# Patient Record
Sex: FEMALE | Race: WHITE | NOT HISPANIC OR LATINO | Employment: OTHER | URBAN - METROPOLITAN AREA
[De-identification: names, ages, dates, MRNs, and addresses within clinical notes are randomized per-mention and may not be internally consistent; named-entity substitution may affect disease eponyms.]

---

## 2022-10-11 ENCOUNTER — NURSE TRIAGE (OUTPATIENT)
Dept: ADMINISTRATIVE | Facility: CLINIC | Age: 72
End: 2022-10-11
Payer: MEDICARE

## 2022-10-11 NOTE — TELEPHONE ENCOUNTER
See the triage notes for more details, I advised it was ok to wait for her appt at 1:30 today.  I advised she rest and relax until her appt time, find something to distract/soothe her in the interim, and keep her appt for 1:30.  I also advised if any sob, cp, blurry vision, balance problems/dizziness to call back to [facility] right away; she verbalized understanding.      Reason for Disposition   Patient wants to be seen     Mrs. Iglesias is already scheduled for 1:30 today with her provider, she is having elevated bp 170/96, , no cardiac or neurological symptoms.  She is asking if she should go to the ED or wait for her 1:30 pm appt today?  Of note, she did not take her morning medications until 1100 this am, just about 1 hour ago.    Additional Information   Negative: Sounds like a life-threatening emergency to the triager   Negative: Symptom is main concern (e.g., headache, chest pain)   Negative: Low blood pressure is main concern   Negative: Systolic BP >= 160 OR Diastolic >= 100, and any cardiac or neurologic symptoms (e.g., chest pain, difficulty breathing, unsteady gait, blurred vision)   Negative: Pregnant 20 or more weeks (or postpartum < 6 weeks) with new hand or face swelling   Negative: Pregnant 20 or more weeks (or postpartum < 6 weeks) and Systolic BP >= 160 OR Diastolic >= 100   Negative: Patient sounds very sick or weak to the triager   Negative: Systolic BP >= 200 OR Diastolic >= 120 and having NO cardiac or neurologic symptoms   Negative: Pregnant 20 or more weeks (or postpartum < 6 weeks) with Systolic BP >= 140 OR Diastolic >= 90   Negative: Systolic BP >= 180 OR Diastolic >= 110, and missed most recent dose of blood pressure medication   Negative: Systolic BP >= 180 OR Diastolic >= 110    Protocols used: Blood Pressure - High-A-OH

## 2022-11-14 ENCOUNTER — OFFICE VISIT (OUTPATIENT)
Dept: CARDIOLOGY | Facility: CLINIC | Age: 72
End: 2022-11-14
Payer: MEDICARE

## 2022-11-14 VITALS
HEART RATE: 79 BPM | HEIGHT: 68 IN | SYSTOLIC BLOOD PRESSURE: 162 MMHG | DIASTOLIC BLOOD PRESSURE: 86 MMHG | BODY MASS INDEX: 40.32 KG/M2 | WEIGHT: 266 LBS | OXYGEN SATURATION: 97 %

## 2022-11-14 DIAGNOSIS — R07.9 CHEST PAIN, UNSPECIFIED TYPE: ICD-10-CM

## 2022-11-14 DIAGNOSIS — F41.9 ANXIETY: ICD-10-CM

## 2022-11-14 DIAGNOSIS — R07.89 ATYPICAL CHEST PAIN: ICD-10-CM

## 2022-11-14 DIAGNOSIS — I10 PRIMARY HYPERTENSION: ICD-10-CM

## 2022-11-14 DIAGNOSIS — Z91.89 AT RISK FOR SLEEP APNEA: ICD-10-CM

## 2022-11-14 DIAGNOSIS — E66.01 CLASS 3 SEVERE OBESITY DUE TO EXCESS CALORIES WITH SERIOUS COMORBIDITY AND BODY MASS INDEX (BMI) OF 40.0 TO 44.9 IN ADULT: ICD-10-CM

## 2022-11-14 PROBLEM — E66.813 CLASS 3 SEVERE OBESITY DUE TO EXCESS CALORIES WITH SERIOUS COMORBIDITY AND BODY MASS INDEX (BMI) OF 40.0 TO 44.9 IN ADULT: Status: ACTIVE | Noted: 2022-11-14

## 2022-11-14 PROCEDURE — 1126F PR PAIN SEVERITY QUANTIFIED, NO PAIN PRESENT: ICD-10-PCS | Mod: CPTII,S$GLB,, | Performed by: INTERNAL MEDICINE

## 2022-11-14 PROCEDURE — 1159F MED LIST DOCD IN RCRD: CPT | Mod: CPTII,S$GLB,, | Performed by: INTERNAL MEDICINE

## 2022-11-14 PROCEDURE — 3079F DIAST BP 80-89 MM HG: CPT | Mod: CPTII,S$GLB,, | Performed by: INTERNAL MEDICINE

## 2022-11-14 PROCEDURE — 1101F PR PT FALLS ASSESS DOC 0-1 FALLS W/OUT INJ PAST YR: ICD-10-PCS | Mod: CPTII,S$GLB,, | Performed by: INTERNAL MEDICINE

## 2022-11-14 PROCEDURE — 3079F PR MOST RECENT DIASTOLIC BLOOD PRESSURE 80-89 MM HG: ICD-10-PCS | Mod: CPTII,S$GLB,, | Performed by: INTERNAL MEDICINE

## 2022-11-14 PROCEDURE — 3008F PR BODY MASS INDEX (BMI) DOCUMENTED: ICD-10-PCS | Mod: CPTII,S$GLB,, | Performed by: INTERNAL MEDICINE

## 2022-11-14 PROCEDURE — 3288F PR FALLS RISK ASSESSMENT DOCUMENTED: ICD-10-PCS | Mod: CPTII,S$GLB,, | Performed by: INTERNAL MEDICINE

## 2022-11-14 PROCEDURE — 3008F BODY MASS INDEX DOCD: CPT | Mod: CPTII,S$GLB,, | Performed by: INTERNAL MEDICINE

## 2022-11-14 PROCEDURE — 99999 PR PBB SHADOW E&M-EST. PATIENT-LVL IV: ICD-10-PCS | Mod: PBBFAC,,, | Performed by: INTERNAL MEDICINE

## 2022-11-14 PROCEDURE — 1160F PR REVIEW ALL MEDS BY PRESCRIBER/CLIN PHARMACIST DOCUMENTED: ICD-10-PCS | Mod: CPTII,S$GLB,, | Performed by: INTERNAL MEDICINE

## 2022-11-14 PROCEDURE — 99204 OFFICE O/P NEW MOD 45 MIN: CPT | Mod: S$GLB,,, | Performed by: INTERNAL MEDICINE

## 2022-11-14 PROCEDURE — 1159F PR MEDICATION LIST DOCUMENTED IN MEDICAL RECORD: ICD-10-PCS | Mod: CPTII,S$GLB,, | Performed by: INTERNAL MEDICINE

## 2022-11-14 PROCEDURE — 1160F RVW MEDS BY RX/DR IN RCRD: CPT | Mod: CPTII,S$GLB,, | Performed by: INTERNAL MEDICINE

## 2022-11-14 PROCEDURE — 99204 PR OFFICE/OUTPT VISIT, NEW, LEVL IV, 45-59 MIN: ICD-10-PCS | Mod: S$GLB,,, | Performed by: INTERNAL MEDICINE

## 2022-11-14 PROCEDURE — 3077F SYST BP >= 140 MM HG: CPT | Mod: CPTII,S$GLB,, | Performed by: INTERNAL MEDICINE

## 2022-11-14 PROCEDURE — 1126F AMNT PAIN NOTED NONE PRSNT: CPT | Mod: CPTII,S$GLB,, | Performed by: INTERNAL MEDICINE

## 2022-11-14 PROCEDURE — 3077F PR MOST RECENT SYSTOLIC BLOOD PRESSURE >= 140 MM HG: ICD-10-PCS | Mod: CPTII,S$GLB,, | Performed by: INTERNAL MEDICINE

## 2022-11-14 PROCEDURE — 99999 PR PBB SHADOW E&M-EST. PATIENT-LVL IV: CPT | Mod: PBBFAC,,, | Performed by: INTERNAL MEDICINE

## 2022-11-14 PROCEDURE — 1101F PT FALLS ASSESS-DOCD LE1/YR: CPT | Mod: CPTII,S$GLB,, | Performed by: INTERNAL MEDICINE

## 2022-11-14 PROCEDURE — 3288F FALL RISK ASSESSMENT DOCD: CPT | Mod: CPTII,S$GLB,, | Performed by: INTERNAL MEDICINE

## 2022-11-14 RX ORDER — CLONIDINE 0.1 MG/24H
1 PATCH, EXTENDED RELEASE TRANSDERMAL
COMMUNITY
End: 2022-11-14 | Stop reason: ALTCHOICE

## 2022-11-14 RX ORDER — CLONIDINE HYDROCHLORIDE 0.1 MG/1
0.1 TABLET ORAL 2 TIMES DAILY
Qty: 60 TABLET | Refills: 11
Start: 2022-11-14 | End: 2023-02-13 | Stop reason: SDUPTHER

## 2022-11-14 RX ORDER — SPIRONOLACTONE 25 MG/1
25 TABLET ORAL 2 TIMES DAILY
Qty: 60 TABLET | Refills: 11 | Status: SHIPPED | OUTPATIENT
Start: 2022-11-14 | End: 2023-02-13 | Stop reason: SDUPTHER

## 2022-11-14 NOTE — ASSESSMENT & PLAN NOTE
Encouraged lifestyle modifications (diet, exercise, and weight loss).    - pt declines bariatric medicine

## 2022-11-14 NOTE — PROGRESS NOTES
Subjective:    Patient ID:  Josie Iglesias is a 72 y.o. female who presents for evaluation of Hypertension and Chest Pain      PCP: Nunu Chin MD     HPI  Pt is a 71 yo F w/ PMH of HLD, HTN, Anxiety d/o, and MO w/ BMI of 40.5 who presents for evaluation of cp and HTN.  She mentions that she has had HTN x30 yrs and over the past month her BP has started to increase and she has had more episodes of anxiety.  She mentions that clonidine was added to her regimen.  She states that her BP runs 140/80s but has been running 160s/90s in the evenings.  She notes chest heaviness which may come and go however denies exertional cp.  She denies sob, edema, orthopnea, PND, presyncope, LOC, palpitations, and claudication.  She notes compliance w/ meds and denies side effects.  She does not exercise and is sedentary.         Past Medical History:   Diagnosis Date    Anxiety disorder, unspecified     Depression     High cholesterol     Hypertension      Past Surgical History:   Procedure Laterality Date    APPENDECTOMY      CHOLECYSTECTOMY      HYSTERECTOMY       Social History     Socioeconomic History    Marital status:    Tobacco Use    Smoking status: Never    Smokeless tobacco: Never     History reviewed. No pertinent family history.    Review of patient's allergies indicates:  No Known Allergies    Medication List with Changes/Refills   New Medications    CLONIDINE (CATAPRES) 0.1 MG TABLET    Take 1 tablet (0.1 mg total) by mouth 2 (two) times daily.    SPIRONOLACTONE (ALDACTONE) 25 MG TABLET    Take 1 tablet (25 mg total) by mouth 2 (two) times daily.   Current Medications    AMLODIPINE (NORVASC) 10 MG TABLET    Take 10 mg by mouth once daily.    HYDROCHLOROTHIAZIDE (HYDRODIURIL) 25 MG TABLET    Take 25 mg by mouth once daily.    ROSUVASTATIN (CRESTOR) 10 MG TABLET    Take 10 mg by mouth once daily.    SERTRALINE (ZOLOFT) 100 MG TABLET    Take 100 mg by mouth once daily.   Discontinued Medications    CLONIDINE  "0.1 MG/24 HR TD PTWK (CATAPRES) 0.1 MG/24 HR    Place 1 patch onto the skin every 7 days.    HYDRALAZINE (APRESOLINE) 25 MG TABLET    Take 1 tablet (25 mg total) by mouth 3 (three) times daily.       Review of Systems   Constitutional: Negative for diaphoresis and fever.   HENT:  Negative for congestion and hearing loss.    Eyes:  Negative for blurred vision and pain.   Cardiovascular:  Positive for chest pain. Negative for claudication, dyspnea on exertion, leg swelling, near-syncope, palpitations and syncope.   Respiratory:  Negative for shortness of breath and sleep disturbances due to breathing.    Hematologic/Lymphatic: Negative for bleeding problem. Does not bruise/bleed easily.   Skin:  Negative for color change and poor wound healing.   Gastrointestinal:  Negative for abdominal pain and nausea.   Genitourinary:  Negative for bladder incontinence and flank pain.   Neurological:  Negative for focal weakness and light-headedness.      Objective:   BP (!) 162/86   Pulse 79   Ht 5' 8" (1.727 m)   Wt 120.7 kg (266 lb)   SpO2 97%   BMI 40.45 kg/m²    Physical Exam  Constitutional:       Appearance: She is well-developed. She is obese. She is not diaphoretic.   HENT:      Head: Normocephalic and atraumatic.   Eyes:      General: No scleral icterus.     Pupils: Pupils are equal, round, and reactive to light.   Neck:      Vascular: No JVD.   Cardiovascular:      Rate and Rhythm: Normal rate and regular rhythm.      Pulses: Intact distal pulses.      Heart sounds: S1 normal and S2 normal. No murmur heard.    No friction rub. No gallop.   Pulmonary:      Effort: Pulmonary effort is normal. No respiratory distress.      Breath sounds: Normal breath sounds. No wheezing or rales.   Chest:      Chest wall: No tenderness.   Abdominal:      General: Bowel sounds are normal. There is no distension.      Palpations: Abdomen is soft. There is no mass.      Tenderness: There is no abdominal tenderness. There is no rebound. "   Musculoskeletal:         General: No tenderness. Normal range of motion.      Cervical back: Normal range of motion and neck supple.   Skin:     General: Skin is warm and dry.      Coloration: Skin is not pale.   Neurological:      Mental Status: She is alert and oriented to person, place, and time.      Coordination: Coordination normal.      Deep Tendon Reflexes: Reflexes normal.   Psychiatric:         Behavior: Behavior normal.         Judgment: Judgment normal.         ECG: 10/18/2022- reviewed.  NSR, Cannot r/o anterior infarct.      Assessment:       1. Chest pain, unspecified type    2. Class 3 severe obesity due to excess calories with serious comorbidity and body mass index (BMI) of 40.0 to 44.9 in adult    3. Primary hypertension    4. At risk for sleep apnea    5. Anxiety    6. Atypical chest pain         Plan:         Class 3 severe obesity due to excess calories with serious comorbidity and body mass index (BMI) of 40.0 to 44.9 in adult  Encouraged lifestyle modifications (diet, exercise, and weight loss).    - pt declines bariatric medicine     Primary hypertension  Uncontrolled.  Goal BP < 140/90.  Compliant w/ meds.   - add spironolactone 25 mg BID w/ goal to wean and d/c clonidine   - continue other meds  - risk factor and lifestyle modifications     At risk for sleep apnea  Refer to sleep medicine.      Anxiety  Refer to psych.     Atypical chest pain  Check ETT to r/o ishcemia.        Total duration of face to face visit time 30 minutes.  Total time spent counseling greater than fifty percent of total visit time.  Counseling included discussion regarding imaging findings, diagnosis, possibilities, treatment options, risks and benefits.  The patient had many questions regarding the options and long-term effects      Augusto Martinez M.D.  Interventional Cardiology

## 2022-11-14 NOTE — ASSESSMENT & PLAN NOTE
Uncontrolled.  Goal BP < 140/90.  Compliant w/ meds.   - add spironolactone 25 mg BID w/ goal to wean and d/c clonidine   - continue other meds  - risk factor and lifestyle modifications

## 2022-11-28 ENCOUNTER — TELEPHONE (OUTPATIENT)
Dept: CARDIOLOGY | Facility: CLINIC | Age: 72
End: 2022-11-28
Payer: MEDICARE

## 2022-11-28 ENCOUNTER — CLINICAL SUPPORT (OUTPATIENT)
Dept: FAMILY MEDICINE | Facility: CLINIC | Age: 72
End: 2022-11-28
Payer: MEDICARE

## 2022-11-28 ENCOUNTER — TELEPHONE (OUTPATIENT)
Dept: FAMILY MEDICINE | Facility: CLINIC | Age: 72
End: 2022-11-28

## 2022-11-28 VITALS — OXYGEN SATURATION: 95 % | DIASTOLIC BLOOD PRESSURE: 85 MMHG | HEART RATE: 75 BPM | SYSTOLIC BLOOD PRESSURE: 145 MMHG

## 2022-11-28 PROCEDURE — 99999 PR PBB SHADOW E&M-EST. PATIENT-LVL II: ICD-10-PCS | Mod: PBBFAC,,,

## 2022-11-28 PROCEDURE — 99999 PR PBB SHADOW E&M-EST. PATIENT-LVL II: CPT | Mod: PBBFAC,,,

## 2022-11-28 NOTE — TELEPHONE ENCOUNTER
----- Message from Augusto Martinez III, MD sent at 11/28/2022  8:47 AM CST -----  Please contact the patient and let them know that their results were fine and do not require any change in treatment.

## 2022-11-28 NOTE — TELEPHONE ENCOUNTER
""Please inform pt to start taking 50 mg of spironolactone (2 tablets) and continue to monitor her BP at home.  Contact the clinic in 2 weeks with her BP changes at that time."       Called pt and lvm with call back number   "

## 2022-11-28 NOTE — TELEPHONE ENCOUNTER
BP check done as ordered.  Patient's bp remains high.  Home log put into chart.  150s/90s x 2 iraj cuff.  Her home cuff only fits on her wrist.  140s/80s.  Patient very anxious about her pressure being elevated.  States she just doesn't feel good.  Instructed to continue taking her meds as ordered and try calming exercises, continue to watch her diet.  Verbalized understanding.  Forwarding to Dr. Martinez for evaluation. She will be called with any med changes.

## 2022-11-29 ENCOUNTER — TELEPHONE (OUTPATIENT)
Dept: CARDIOLOGY | Facility: CLINIC | Age: 72
End: 2022-11-29
Payer: MEDICARE

## 2022-11-29 NOTE — TELEPHONE ENCOUNTER
----- Message from Xavier Silverio sent at 11/28/2022  5:20 PM CST -----  .Type:  Patient Returning Call    Who Called:Pt  Who Left Message for Patient:Emy Snyder  Would the patient rather a call back or a response via [facility]? Call  Best Call Back Number:[phone]

## 2022-11-29 NOTE — TELEPHONE ENCOUNTER
Called pt x2, already lvm yesterday    Called pt's spouse, lvm stating we are trying to contact Josie Iglesias left call back number

## 2022-11-29 NOTE — TELEPHONE ENCOUNTER
Received pt call, spoke with pt regarding medication instructions, informed pt to start taking 50 mg of spironolactone (2 tablets) and continue to monitor her BP at home. Informed pt to contact the clinic in 2 weeks with her BP changes. Per Dr. Martinez   Pt stated, seeming frustrated that this was dosage she is already taking, I informed pt that we will continue to monitor her blood pressure as the medication takes full effect, pt expressed understanding     Pt then asks about her stress test results claiming she has not heard anything about them, I informed pt that we tried to contact them regarding the results and left a voice mail. I then informed pt of test results. Pt expressed understanding.

## 2022-12-12 ENCOUNTER — TELEPHONE (OUTPATIENT)
Dept: CARDIOLOGY | Facility: CLINIC | Age: 72
End: 2022-12-12
Payer: MEDICARE

## 2022-12-12 NOTE — TELEPHONE ENCOUNTER
Patient called to give blood pressure readings from the last two weeks     Pt did not have mychart and was not able to send a photo, readings were given verbally    Date, morning time, afternoon time    11/29- 147/88   141/84  11/30- 140/98 144/87  12/1- 138/93  130/92  12/2- 133/93   135/85  12/3- 143/97  139/87   12/4-  139/91 123/89   12/5- 140/97  132/86  12/6- 135/94  126/96  12/7- 131/85 123/92  12/8- 133/97  126/81  12/9- 131/100 137/85    12/10- 124/89  128/80  12/11- 130/94 128/91  12/12- 129/92 119/73

## 2022-12-12 NOTE — TELEPHONE ENCOUNTER
----- Message from Rowan Campos sent at 12/12/2022  1:18 PM CST -----  Needs advice from nurse:      Who Called:pt  Regarding:returning a call to [facility]  Would the patient rather a call back or VIA [facility]?  Best Call Back number:[phone]  Additional Info:

## 2022-12-28 ENCOUNTER — TELEPHONE (OUTPATIENT)
Dept: CARDIOLOGY | Facility: CLINIC | Age: 72
End: 2022-12-28
Payer: MEDICARE

## 2022-12-28 NOTE — TELEPHONE ENCOUNTER
----- Message from Rowan Campos sent at 12/28/2022  3:32 PM CST -----  Needs advice from nurse:      Who Called:pt  Regarding:returning a call to [facility]  Would the patient rather a call back or [facility]IA [facility]?  Best Call Back number:[phone]  Additional Info:

## 2023-02-13 ENCOUNTER — OFFICE VISIT (OUTPATIENT)
Dept: CARDIOLOGY | Facility: CLINIC | Age: 73
End: 2023-02-13
Payer: MEDICARE

## 2023-02-13 VITALS
BODY MASS INDEX: 39.25 KG/M2 | HEIGHT: 68 IN | HEART RATE: 78 BPM | SYSTOLIC BLOOD PRESSURE: 158 MMHG | OXYGEN SATURATION: 97 % | WEIGHT: 259 LBS | DIASTOLIC BLOOD PRESSURE: 88 MMHG

## 2023-02-13 DIAGNOSIS — F41.9 ANXIETY: ICD-10-CM

## 2023-02-13 DIAGNOSIS — R07.89 ATYPICAL CHEST PAIN: ICD-10-CM

## 2023-02-13 DIAGNOSIS — E66.01 CLASS 2 SEVERE OBESITY DUE TO EXCESS CALORIES WITH SERIOUS COMORBIDITY AND BODY MASS INDEX (BMI) OF 39.0 TO 39.9 IN ADULT: ICD-10-CM

## 2023-02-13 DIAGNOSIS — Z91.89 AT RISK FOR SLEEP APNEA: ICD-10-CM

## 2023-02-13 DIAGNOSIS — I10 PRIMARY HYPERTENSION: ICD-10-CM

## 2023-02-13 PROBLEM — E66.812 CLASS 2 SEVERE OBESITY DUE TO EXCESS CALORIES WITH SERIOUS COMORBIDITY AND BODY MASS INDEX (BMI) OF 39.0 TO 39.9 IN ADULT: Status: ACTIVE | Noted: 2022-11-14

## 2023-02-13 PROCEDURE — 3288F FALL RISK ASSESSMENT DOCD: CPT | Mod: CPTII,S$GLB,, | Performed by: INTERNAL MEDICINE

## 2023-02-13 PROCEDURE — 1159F PR MEDICATION LIST DOCUMENTED IN MEDICAL RECORD: ICD-10-PCS | Mod: CPTII,S$GLB,, | Performed by: INTERNAL MEDICINE

## 2023-02-13 PROCEDURE — 3008F BODY MASS INDEX DOCD: CPT | Mod: CPTII,S$GLB,, | Performed by: INTERNAL MEDICINE

## 2023-02-13 PROCEDURE — 1126F AMNT PAIN NOTED NONE PRSNT: CPT | Mod: CPTII,S$GLB,, | Performed by: INTERNAL MEDICINE

## 2023-02-13 PROCEDURE — 1160F RVW MEDS BY RX/DR IN RCRD: CPT | Mod: CPTII,S$GLB,, | Performed by: INTERNAL MEDICINE

## 2023-02-13 PROCEDURE — 3077F PR MOST RECENT SYSTOLIC BLOOD PRESSURE >= 140 MM HG: ICD-10-PCS | Mod: CPTII,S$GLB,, | Performed by: INTERNAL MEDICINE

## 2023-02-13 PROCEDURE — 3288F PR FALLS RISK ASSESSMENT DOCUMENTED: ICD-10-PCS | Mod: CPTII,S$GLB,, | Performed by: INTERNAL MEDICINE

## 2023-02-13 PROCEDURE — 99999 PR PBB SHADOW E&M-EST. PATIENT-LVL III: ICD-10-PCS | Mod: PBBFAC,,, | Performed by: INTERNAL MEDICINE

## 2023-02-13 PROCEDURE — 1159F MED LIST DOCD IN RCRD: CPT | Mod: CPTII,S$GLB,, | Performed by: INTERNAL MEDICINE

## 2023-02-13 PROCEDURE — 1101F PT FALLS ASSESS-DOCD LE1/YR: CPT | Mod: CPTII,S$GLB,, | Performed by: INTERNAL MEDICINE

## 2023-02-13 PROCEDURE — 99214 OFFICE O/P EST MOD 30 MIN: CPT | Mod: S$GLB,,, | Performed by: INTERNAL MEDICINE

## 2023-02-13 PROCEDURE — 1101F PR PT FALLS ASSESS DOC 0-1 FALLS W/OUT INJ PAST YR: ICD-10-PCS | Mod: CPTII,S$GLB,, | Performed by: INTERNAL MEDICINE

## 2023-02-13 PROCEDURE — 1126F PR PAIN SEVERITY QUANTIFIED, NO PAIN PRESENT: ICD-10-PCS | Mod: CPTII,S$GLB,, | Performed by: INTERNAL MEDICINE

## 2023-02-13 PROCEDURE — 99214 PR OFFICE/OUTPT VISIT, EST, LEVL IV, 30-39 MIN: ICD-10-PCS | Mod: S$GLB,,, | Performed by: INTERNAL MEDICINE

## 2023-02-13 PROCEDURE — 1160F PR REVIEW ALL MEDS BY PRESCRIBER/CLIN PHARMACIST DOCUMENTED: ICD-10-PCS | Mod: CPTII,S$GLB,, | Performed by: INTERNAL MEDICINE

## 2023-02-13 PROCEDURE — 99999 PR PBB SHADOW E&M-EST. PATIENT-LVL III: CPT | Mod: PBBFAC,,, | Performed by: INTERNAL MEDICINE

## 2023-02-13 PROCEDURE — 3079F PR MOST RECENT DIASTOLIC BLOOD PRESSURE 80-89 MM HG: ICD-10-PCS | Mod: CPTII,S$GLB,, | Performed by: INTERNAL MEDICINE

## 2023-02-13 PROCEDURE — 3079F DIAST BP 80-89 MM HG: CPT | Mod: CPTII,S$GLB,, | Performed by: INTERNAL MEDICINE

## 2023-02-13 PROCEDURE — 3077F SYST BP >= 140 MM HG: CPT | Mod: CPTII,S$GLB,, | Performed by: INTERNAL MEDICINE

## 2023-02-13 PROCEDURE — 3008F PR BODY MASS INDEX (BMI) DOCUMENTED: ICD-10-PCS | Mod: CPTII,S$GLB,, | Performed by: INTERNAL MEDICINE

## 2023-02-13 RX ORDER — SPIRONOLACTONE 50 MG/1
50 TABLET, FILM COATED ORAL 2 TIMES DAILY
Qty: 60 TABLET | Refills: 11 | Status: SHIPPED | OUTPATIENT
Start: 2023-02-13 | End: 2023-03-30 | Stop reason: SDUPTHER

## 2023-02-13 RX ORDER — CLONIDINE HYDROCHLORIDE 0.1 MG/1
0.1 TABLET ORAL DAILY
Qty: 60 TABLET | Refills: 11
Start: 2023-02-13 | End: 2024-02-13

## 2023-02-13 NOTE — ASSESSMENT & PLAN NOTE
Improved.  Goal BP < 140/90.  Compliant w/ meds.   - increase spironolactone to 50 mg BID and wean and d/c clonidine to daily x2 wks then d/c  - continue other meds  - pt to continue to monitor BP   - risk factor and lifestyle modifications

## 2023-02-13 NOTE — PROGRESS NOTES
Subjective:    Patient ID:  Josie Iglesias is a 72 y.o. female who presents for follow-up of Follow-up        PCP: Nunu Chin MD     HPI  Pt is a 73 yo F w/ PMH of HLD, HTN, Anxiety d/o, and MO w/ BMI of 39.4 who presents for f/u of cp and HTN.  She was last seen 11/14/2022 and mentioned that she had HTN x30 yrs and over the past few months her BP had started to increase and had more episodes of anxiety.  Spironolactone was added w/ the goal to d/c clonidine and had an ETT which noted poor functional tolerance. Since this time she has been doing well and states her BP has improved.  She states that her BP now runs 110-130s/80-90s.  She denies further episodes of cp.  She denies sob, edema, orthopnea, PND, presyncope, LOC, palpitations, and claudication.  She notes compliance w/ meds and denies side effects.  She does not exercise and is sedentary.         Past Medical History:   Diagnosis Date    Anxiety disorder, unspecified     Depression     High cholesterol     Hypertension      Past Surgical History:   Procedure Laterality Date    APPENDECTOMY      CHOLECYSTECTOMY      HYSTERECTOMY       Social History     Socioeconomic History    Marital status:    Tobacco Use    Smoking status: Never    Smokeless tobacco: Never     History reviewed. No pertinent family history.    Review of patient's allergies indicates:  No Known Allergies    Medication List with Changes/Refills   Current Medications    AMLODIPINE (NORVASC) 10 MG TABLET    Take 10 mg by mouth once daily.    HYDROCHLOROTHIAZIDE (HYDRODIURIL) 25 MG TABLET    Take 25 mg by mouth once daily.    ROSUVASTATIN (CRESTOR) 10 MG TABLET    Take 10 mg by mouth once daily.    SERTRALINE (ZOLOFT) 100 MG TABLET    Take 100 mg by mouth once daily.   Changed and/or Refilled Medications    Modified Medication Previous Medication    CLONIDINE (CATAPRES) 0.1 MG TABLET cloNIDine (CATAPRES) 0.1 MG tablet       Take 1 tablet (0.1 mg total) by mouth once daily.     "Take 1 tablet (0.1 mg total) by mouth 2 (two) times daily.    SPIRONOLACTONE (ALDACTONE) 50 MG TABLET spironolactone (ALDACTONE) 25 MG tablet       Take 1 tablet (50 mg total) by mouth 2 (two) times daily.    Take 1 tablet (25 mg total) by mouth 2 (two) times daily.       Review of Systems   Constitutional: Negative for diaphoresis and fever.   HENT:  Negative for congestion and hearing loss.    Eyes:  Negative for blurred vision and pain.   Cardiovascular:  Positive for chest pain. Negative for claudication, dyspnea on exertion, leg swelling, near-syncope, palpitations and syncope.   Respiratory:  Negative for shortness of breath and sleep disturbances due to breathing.    Hematologic/Lymphatic: Negative for bleeding problem. Does not bruise/bleed easily.   Skin:  Negative for color change and poor wound healing.   Gastrointestinal:  Negative for abdominal pain and nausea.   Genitourinary:  Negative for bladder incontinence and flank pain.   Neurological:  Negative for focal weakness and light-headedness.      Objective:   BP (!) 158/88   Pulse 78   Ht 5' 8" (1.727 m)   Wt 117.5 kg (259 lb)   SpO2 97%   BMI 39.38 kg/m²    Physical Exam  Constitutional:       Appearance: She is well-developed. She is obese. She is not diaphoretic.   HENT:      Head: Normocephalic and atraumatic.   Eyes:      General: No scleral icterus.     Pupils: Pupils are equal, round, and reactive to light.   Neck:      Vascular: No JVD.   Cardiovascular:      Rate and Rhythm: Normal rate and regular rhythm.      Pulses: Intact distal pulses.      Heart sounds: S1 normal and S2 normal. No murmur heard.    No friction rub. No gallop.   Pulmonary:      Effort: Pulmonary effort is normal. No respiratory distress.      Breath sounds: Normal breath sounds. No wheezing or rales.   Chest:      Chest wall: No tenderness.   Abdominal:      General: Bowel sounds are normal. There is no distension.      Palpations: Abdomen is soft. There is no mass. "      Tenderness: There is no abdominal tenderness. There is no rebound.   Musculoskeletal:         General: No tenderness. Normal range of motion.      Cervical back: Normal range of motion and neck supple.   Skin:     General: Skin is warm and dry.      Coloration: Skin is not pale.   Neurological:      Mental Status: She is alert and oriented to person, place, and time.      Coordination: Coordination normal.      Deep Tendon Reflexes: Reflexes normal.   Psychiatric:         Behavior: Behavior normal.         Judgment: Judgment normal.         ECG: 10/18/2022- reviewed.  NSR, Cannot r/o anterior infarct.      ETT: 11/23/2022- reviewed.    Conclusion         The patient exercised for 1 minutes 9 seconds on a Fausto protocol, corresponding to a functional capacity of 3 METS, achieving a peak heart rate of 150 bpm, which is 105 % of the age predicted maximum heart rate.    The EKG portion of this study is negative for ischemia.    The patient reported no chest pain during the stress test.    The blood pressure response to stress was normal.    During stress, rare PVCs are noted.    Assessment:       1. Primary hypertension    2. Class 2 severe obesity due to excess calories with serious comorbidity and body mass index (BMI) of 39.0 to 39.9 in adult    3. Anxiety    4. Atypical chest pain    5. At risk for sleep apnea         Plan:         Anxiety  Pt declines to see psych.       Primary hypertension  Improved.  Goal BP < 140/90.  Compliant w/ meds.   - increase spironolactone to 50 mg BID and wean and d/c clonidine to daily x2 wks then d/c  - continue other meds  - pt to continue to monitor BP   - risk factor and lifestyle modifications     Class 2 severe obesity due to excess calories with serious comorbidity and body mass index (BMI) of 39.0 to 39.9 in adult  Encouraged lifestyle modifications (diet, exercise, and weight loss).    - pt declines bariatric medicine     Atypical chest pain  Resolved.  Negative ETT for  ischemia however noted to have poor exercise tolerance.      At risk for sleep apnea  Declines sleep medicine.        Total duration of face to face visit time 30 minutes.  Total time spent counseling greater than fifty percent of total visit time.  Counseling included discussion regarding imaging findings, diagnosis, possibilities, treatment options, risks and benefits.  The patient had many questions regarding the options and long-term effects      Augusto Martinez M.D.  Interventional Cardiology

## 2023-02-28 ENCOUNTER — TELEPHONE (OUTPATIENT)
Dept: CARDIOLOGY | Facility: CLINIC | Age: 73
End: 2023-02-28
Payer: MEDICARE

## 2023-02-28 NOTE — TELEPHONE ENCOUNTER
----- Message from Xavier Silverio sent at 2/28/2023  9:32 AM CST -----  .Type:  Patient Returning Call    Who Called:Pt  Who Left Message for Patient:Petra  Would the patient rather a call back or a response via [facility]? Call  Best Call Back Number:[phone]  Additional Information:   Blood Test Results

## 2023-02-28 NOTE — TELEPHONE ENCOUNTER
"Informed patient     " Her BP has improved as it is within the normal range 120-130s.  I will not make any other changes to her regimen since her BP is in a good range.  She may follow up with her PCP for her fatigue.  Thanks." - Dr. Martinez     Patient expressed understanding and had no further questions   "

## 2023-02-28 NOTE — TELEPHONE ENCOUNTER
"Patient called to provide BP readings from the last two weeks:    2/13   Am 129/90  Pm 130/94    2/14  Am 128/97  Pm 126/92    2/15  Am 120/96  Pm 130/99    2/16  Am 136/93  Pm 134/92    2/17  Am 126/96  Pm 133/ 93    2/18   Am 130/94  Pm 124/93    2/19   Am 125/97  Pm 126/98    2/20   Am 123/95  Pm 121/97    2/21   Am 134/99  Pm 127/94    2/22   Am 124/92  Pm 125/91    2/23  Am 131/ 92  Pm 126/92    2/24  Am 129/96  Pm 137/90    2/25  Am 124/95  Pm 135/94    2/26   Am 132/95  Pm 126/95    2/27  Am 133/97  Pm 139/96      Pt also states she is not feeling as well since her last med change, patient states she feels "flushed, tired and just doesn't feel right"     Verified correct dosages with patient, pt states she is taking meds as prescribed     Patient states she would like to inform Dr. Martinez that she feels her med adjustments are not helping her pressure, Informed patient I would speak with Dr. Martinez regarding her concerns and send over her readings. Stated I will give her a call back, pt verbalized understanding      "

## 2023-03-30 DIAGNOSIS — I10 PRIMARY HYPERTENSION: ICD-10-CM

## 2023-03-30 RX ORDER — SPIRONOLACTONE 50 MG/1
50 TABLET, FILM COATED ORAL 2 TIMES DAILY
Qty: 60 TABLET | Refills: 11 | Status: SHIPPED | OUTPATIENT
Start: 2023-03-30 | End: 2024-01-30

## 2023-03-30 NOTE — TELEPHONE ENCOUNTER
----- Message from Radha Maxwell sent at 3/30/2023  2:47 PM CDT -----  Type:  Needs Medical Advice    Who Called: Pt   Would the patient rather a call back or a response via [facility]? call  Best Call Back Number:  [phone]  Additional Information:  Pt would like for Petra to give her a call back regarding a medication.  Pt states that she needs to discuss it with Petra.

## 2023-08-14 ENCOUNTER — OFFICE VISIT (OUTPATIENT)
Dept: CARDIOLOGY | Facility: CLINIC | Age: 73
End: 2023-08-14
Payer: MEDICARE

## 2023-08-14 VITALS
HEIGHT: 68 IN | HEART RATE: 71 BPM | OXYGEN SATURATION: 97 % | SYSTOLIC BLOOD PRESSURE: 142 MMHG | DIASTOLIC BLOOD PRESSURE: 92 MMHG | BODY MASS INDEX: 39.48 KG/M2 | WEIGHT: 260.5 LBS

## 2023-08-14 DIAGNOSIS — E66.01 CLASS 2 SEVERE OBESITY DUE TO EXCESS CALORIES WITH SERIOUS COMORBIDITY AND BODY MASS INDEX (BMI) OF 39.0 TO 39.9 IN ADULT: ICD-10-CM

## 2023-08-14 DIAGNOSIS — Z91.89 AT RISK FOR SLEEP APNEA: ICD-10-CM

## 2023-08-14 DIAGNOSIS — I10 PRIMARY HYPERTENSION: ICD-10-CM

## 2023-08-14 DIAGNOSIS — F41.9 ANXIETY: ICD-10-CM

## 2023-08-14 PROBLEM — R07.89 ATYPICAL CHEST PAIN: Status: RESOLVED | Noted: 2022-11-14 | Resolved: 2023-08-14

## 2023-08-14 PROCEDURE — 1126F PR PAIN SEVERITY QUANTIFIED, NO PAIN PRESENT: ICD-10-PCS | Mod: CPTII,S$GLB,, | Performed by: INTERNAL MEDICINE

## 2023-08-14 PROCEDURE — 3080F PR MOST RECENT DIASTOLIC BLOOD PRESSURE >= 90 MM HG: ICD-10-PCS | Mod: CPTII,S$GLB,, | Performed by: INTERNAL MEDICINE

## 2023-08-14 PROCEDURE — 3008F BODY MASS INDEX DOCD: CPT | Mod: CPTII,S$GLB,, | Performed by: INTERNAL MEDICINE

## 2023-08-14 PROCEDURE — 99999 PR PBB SHADOW E&M-EST. PATIENT-LVL III: CPT | Mod: PBBFAC,,, | Performed by: INTERNAL MEDICINE

## 2023-08-14 PROCEDURE — 3077F SYST BP >= 140 MM HG: CPT | Mod: CPTII,S$GLB,, | Performed by: INTERNAL MEDICINE

## 2023-08-14 PROCEDURE — 99999 PR PBB SHADOW E&M-EST. PATIENT-LVL III: ICD-10-PCS | Mod: PBBFAC,,, | Performed by: INTERNAL MEDICINE

## 2023-08-14 PROCEDURE — 1101F PR PT FALLS ASSESS DOC 0-1 FALLS W/OUT INJ PAST YR: ICD-10-PCS | Mod: CPTII,S$GLB,, | Performed by: INTERNAL MEDICINE

## 2023-08-14 PROCEDURE — 3288F FALL RISK ASSESSMENT DOCD: CPT | Mod: CPTII,S$GLB,, | Performed by: INTERNAL MEDICINE

## 2023-08-14 PROCEDURE — 3008F PR BODY MASS INDEX (BMI) DOCUMENTED: ICD-10-PCS | Mod: CPTII,S$GLB,, | Performed by: INTERNAL MEDICINE

## 2023-08-14 PROCEDURE — 99214 OFFICE O/P EST MOD 30 MIN: CPT | Mod: S$GLB,,, | Performed by: INTERNAL MEDICINE

## 2023-08-14 PROCEDURE — 1101F PT FALLS ASSESS-DOCD LE1/YR: CPT | Mod: CPTII,S$GLB,, | Performed by: INTERNAL MEDICINE

## 2023-08-14 PROCEDURE — 3288F PR FALLS RISK ASSESSMENT DOCUMENTED: ICD-10-PCS | Mod: CPTII,S$GLB,, | Performed by: INTERNAL MEDICINE

## 2023-08-14 PROCEDURE — 99214 PR OFFICE/OUTPT VISIT, EST, LEVL IV, 30-39 MIN: ICD-10-PCS | Mod: S$GLB,,, | Performed by: INTERNAL MEDICINE

## 2023-08-14 PROCEDURE — 3080F DIAST BP >= 90 MM HG: CPT | Mod: CPTII,S$GLB,, | Performed by: INTERNAL MEDICINE

## 2023-08-14 PROCEDURE — 3077F PR MOST RECENT SYSTOLIC BLOOD PRESSURE >= 140 MM HG: ICD-10-PCS | Mod: CPTII,S$GLB,, | Performed by: INTERNAL MEDICINE

## 2023-08-14 PROCEDURE — 1160F PR REVIEW ALL MEDS BY PRESCRIBER/CLIN PHARMACIST DOCUMENTED: ICD-10-PCS | Mod: CPTII,S$GLB,, | Performed by: INTERNAL MEDICINE

## 2023-08-14 PROCEDURE — 1159F PR MEDICATION LIST DOCUMENTED IN MEDICAL RECORD: ICD-10-PCS | Mod: CPTII,S$GLB,, | Performed by: INTERNAL MEDICINE

## 2023-08-14 PROCEDURE — 1160F RVW MEDS BY RX/DR IN RCRD: CPT | Mod: CPTII,S$GLB,, | Performed by: INTERNAL MEDICINE

## 2023-08-14 PROCEDURE — 1126F AMNT PAIN NOTED NONE PRSNT: CPT | Mod: CPTII,S$GLB,, | Performed by: INTERNAL MEDICINE

## 2023-08-14 PROCEDURE — 1159F MED LIST DOCD IN RCRD: CPT | Mod: CPTII,S$GLB,, | Performed by: INTERNAL MEDICINE

## 2023-08-14 NOTE — PROGRESS NOTES
Subjective:    Patient ID:  Josie Iglesias is a 72 y.o. female who presents for follow-up of Follow-up (6mo f/u)        PCP: Nunu Chin MD     HPI  Pt is a 71 yo F w/ PMH of HLD, HTN, Anxiety d/o, and MO w/ BMI of 39.6 who presents for f/u of cp and HTN.  She was last seen 2/13/2023 and mediations were adjusted.  Since this time she has been doing ok however notes poor sleep but does not wish to see sleep med.  She states that her BP now is running elevated but does not know the numbers.  She denies cp, sob, edema, orthopnea, PND, presyncope, LOC, palpitations, and claudication.  She notes compliance w/ meds and denies side effects.  She does not exercise and is sedentary.         Past Medical History:   Diagnosis Date    Anxiety disorder, unspecified     Depression     High cholesterol     Hypertension      Past Surgical History:   Procedure Laterality Date    APPENDECTOMY      CHOLECYSTECTOMY      HYSTERECTOMY       Social History     Socioeconomic History    Marital status:    Tobacco Use    Smoking status: Never    Smokeless tobacco: Never     History reviewed. No pertinent family history.    Review of patient's allergies indicates:  No Known Allergies    Medication List with Changes/Refills   Current Medications    AMLODIPINE (NORVASC) 10 MG TABLET    Take 10 mg by mouth once daily.    CLONIDINE (CATAPRES) 0.1 MG TABLET    Take 1 tablet (0.1 mg total) by mouth once daily.    HYDROCHLOROTHIAZIDE (HYDRODIURIL) 25 MG TABLET    Take 25 mg by mouth once daily.    ROSUVASTATIN (CRESTOR) 10 MG TABLET    Take 10 mg by mouth once daily.    SERTRALINE (ZOLOFT) 100 MG TABLET    Take 100 mg by mouth once daily.    SPIRONOLACTONE (ALDACTONE) 50 MG TABLET    Take 1 tablet (50 mg total) by mouth 2 (two) times daily.       Review of Systems   Constitutional: Negative for diaphoresis and fever.   HENT:  Negative for congestion and hearing loss.    Eyes:  Negative for blurred vision and pain.   Cardiovascular:   "Negative for chest pain, claudication, dyspnea on exertion, leg swelling, near-syncope, palpitations and syncope.   Respiratory:  Negative for shortness of breath and sleep disturbances due to breathing.    Hematologic/Lymphatic: Negative for bleeding problem. Does not bruise/bleed easily.   Skin:  Negative for color change and poor wound healing.   Gastrointestinal:  Negative for abdominal pain and nausea.   Genitourinary:  Negative for bladder incontinence and flank pain.   Neurological:  Negative for focal weakness and light-headedness.        Objective:   BP (!) 142/92 (BP Location: Right arm, Patient Position: Sitting, BP Method: X-Large (Manual))   Pulse 71   Ht 5' 8" (1.727 m)   Wt 118.2 kg (260 lb 8 oz)   SpO2 97%   BMI 39.61 kg/m²    Physical Exam  Constitutional:       Appearance: She is well-developed. She is obese. She is not diaphoretic.   HENT:      Head: Normocephalic and atraumatic.   Eyes:      General: No scleral icterus.     Pupils: Pupils are equal, round, and reactive to light.   Neck:      Vascular: No JVD.   Cardiovascular:      Rate and Rhythm: Normal rate and regular rhythm.      Pulses: Intact distal pulses.      Heart sounds: S1 normal and S2 normal. No murmur heard.     No friction rub. No gallop.   Pulmonary:      Effort: Pulmonary effort is normal. No respiratory distress.      Breath sounds: Normal breath sounds. No wheezing or rales.   Chest:      Chest wall: No tenderness.   Abdominal:      General: Bowel sounds are normal. There is no distension.      Palpations: Abdomen is soft. There is no mass.      Tenderness: There is no abdominal tenderness. There is no rebound.   Musculoskeletal:         General: No tenderness. Normal range of motion.      Cervical back: Normal range of motion and neck supple.      Right lower leg: No edema.      Left lower leg: No edema.   Skin:     General: Skin is warm and dry.      Coloration: Skin is not pale.   Neurological:      Mental Status: She " is alert and oriented to person, place, and time.      Coordination: Coordination normal.      Deep Tendon Reflexes: Reflexes normal.   Psychiatric:         Behavior: Behavior normal.         Judgment: Judgment normal.           ECG: 10/18/2022- reviewed.  NSR, Cannot r/o anterior infarct.      ETT: 11/23/2022- reviewed.    Conclusion         The patient exercised for 1 minutes 9 seconds on a Fausto protocol, corresponding to a functional capacity of 3 METS, achieving a peak heart rate of 150 bpm, which is 105 % of the age predicted maximum heart rate.    The EKG portion of this study is negative for ischemia.    The patient reported no chest pain during the stress test.    The blood pressure response to stress was normal.    During stress, rare PVCs are noted.    Assessment:       1. Primary hypertension    2. Class 2 severe obesity due to excess calories with serious comorbidity and body mass index (BMI) of 39.0 to 39.9 in adult    3. Anxiety    4. At risk for sleep apnea         Plan:         Primary hypertension  Improved but not at goal.  Goal BP < 140/90.  Compliant w/ meds.   - continue spironolactone 50 mg BID  - continue other meds  - pt to continue to monitor BP   - risk factor and lifestyle modifications     Class 2 severe obesity due to excess calories with serious comorbidity and body mass index (BMI) of 39.0 to 39.9 in adult  Encouraged lifestyle modifications (diet, exercise, and weight loss).    - pt declines bariatric medicine     Anxiety  Pt declines to see psych.   Continue management per PCP.  States her anxiety is improved.      At risk for sleep apnea  Declines sleep medicine.        Total duration of face to face visit time 30 minutes.  Total time spent counseling greater than fifty percent of total visit time.  Counseling included discussion regarding imaging findings, diagnosis, possibilities, treatment options, risks and benefits.  The patient had many questions regarding the options and  long-term effects      Augusto Martinez M.D.  Interventional Cardiology

## 2023-08-14 NOTE — ASSESSMENT & PLAN NOTE
Improved but not at goal.  Goal BP < 140/90.  Compliant w/ meds.   - continue spironolactone 50 mg BID  - continue other meds  - pt to continue to monitor BP   - risk factor and lifestyle modifications

## 2023-09-05 ENCOUNTER — TELEPHONE (OUTPATIENT)
Dept: CARDIOLOGY | Facility: CLINIC | Age: 73
End: 2023-09-05
Payer: MEDICARE

## 2023-09-05 NOTE — TELEPHONE ENCOUNTER
----- Message from Augusto Martinez III, MD sent at 8/30/2023 10:13 PM CDT -----  Please contact the patient and let them know that their results were fine and do not require any change in treatment.

## 2024-01-24 DIAGNOSIS — I10 PRIMARY HYPERTENSION: ICD-10-CM

## 2024-01-30 RX ORDER — SPIRONOLACTONE 50 MG/1
50 TABLET, FILM COATED ORAL 2 TIMES DAILY
Qty: 180 TABLET | Refills: 3 | Status: SHIPPED | OUTPATIENT
Start: 2024-01-30

## 2025-05-01 NOTE — PROGRESS NOTES
Cardiology Clinic note    Subjective:   Patient ID:  Josie Iglesias is a 74 y.o. female who presents for follow-up of HTN, HLD    HPI    2025: Previous patient of Dr. Martinez as below, initial visit for me. 73 yo F with hx of anxiety/depression, HTN, HLD here for routine F/U. Seen in clinic with spouse, reports overall doing okay. Reports over the past few months has had some ongoing issues with anxiety, back pain, generalized fatigue. She is concerned with her significant family history of heart disease. BP high in clinic today, does not monitor at home. Ongoing issues with poor sleep. No new CV s/s, denies CP, SOB/HERNANDEZ, orthopnea, PND, syncope, palpitations, LE edema. Reports compliance and tolerance with all medications.      2023 (Dr. Martinez)   Pt is a 73 yo F w/ PMH of HLD, HTN, Anxiety d/o, and MO w/ BMI of 39.6 who presents for f/u of cp and HTN.  She was last seen 2023 and mediations were adjusted.  Since this time she has been doing ok however notes poor sleep but does not wish to see sleep med.  She states that her BP now is running elevated but does not know the numbers.  She denies cp, sob, edema, orthopnea, PND, presyncope, LOC, palpitations, and claudication.  She notes compliance w/ meds and denies side effects.  She does not exercise and is sedentary.          CV Hx  -----------------------    FH  -grandmother (maternal) CAD,  in 80s from heart dx  -mother - CAD,  in 80s from heart dx  - aunts - cardiovascular disease      Echo 2023    Left Ventricle: The left ventricle is normal in size. There is concentric remodeling. Normal wall motion. There is normal systolic function. Ejection fraction by visual approximation is 65%. There is normal diastolic function.    Right Ventricle: Systolic function is normal.    Pulmonary Artery: The estimated pulmonary artery systolic pressure is 28 mmHg.    IVC/SVC: Normal venous pressure at 3 mmHg.    Stress ECG     The patient  exercised for 1 minutes 9 seconds on a Fausto protocol, corresponding to a functional capacity of 3 METS, achieving a peak heart rate of 150 bpm, which is 105 % of the age predicted maximum heart rate.    The EKG portion of this study is negative for ischemia.    The patient reported no chest pain during the stress test.    The blood pressure response to stress was normal.    During stress, rare PVCs are noted.    Past Medical History:   Diagnosis Date    Anxiety disorder, unspecified     Depression     High cholesterol     Hypertension           Patient Active Problem List    Diagnosis Date Noted    Chronic fatigue 05/02/2025    Hyperlipidemia 05/02/2025    Class 2 severe obesity due to excess calories with serious comorbidity and body mass index (BMI) of 39.0 to 39.9 in adult 11/14/2022    Primary hypertension 11/14/2022    At risk for sleep apnea 11/14/2022    Anxiety 11/14/2022       Patient's Medications   New Prescriptions    No medications on file   Previous Medications    AMLODIPINE (NORVASC) 10 MG TABLET    Take 10 mg by mouth once daily.    CLONIDINE (CATAPRES) 0.1 MG TABLET    Take 1 tablet (0.1 mg total) by mouth once daily.    ROSUVASTATIN (CRESTOR) 10 MG TABLET    Take 10 mg by mouth once daily.    SERTRALINE (ZOLOFT) 100 MG TABLET    Take 100 mg by mouth once daily.    SPIRONOLACTONE (ALDACTONE) 50 MG TABLET    TAKE 1 TABLET(50 MG) BY MOUTH TWICE DAILY    VALSARTAN-HYDROCHLOROTHIAZIDE (DIOVAN-HCT) 80-12.5 MG PER TABLET    Take 1 tablet by mouth every morning.   Modified Medications    No medications on file   Discontinued Medications    HYDROCHLOROTHIAZIDE (HYDRODIURIL) 25 MG TABLET    Take 25 mg by mouth once daily.        Review of Systems   Constitutional: Positive for malaise/fatigue. Negative for chills, decreased appetite, diaphoresis, weight gain and weight loss.   Cardiovascular:  Negative for chest pain, claudication, dyspnea on exertion, irregular heartbeat, leg swelling, near-syncope,  "orthopnea, palpitations, paroxysmal nocturnal dyspnea and syncope.   Respiratory:  Negative for cough, hemoptysis, shortness of breath and snoring.    Musculoskeletal:  Positive for back pain and joint pain.   Gastrointestinal:  Negative for bloating, abdominal pain, nausea and vomiting.   Neurological:  Negative for light-headedness and weakness.   Psychiatric/Behavioral:  Negative for suicidal ideas. The patient is nervous/anxious.        No family history on file.    Social History     Socioeconomic History    Marital status:    Tobacco Use    Smoking status: Never    Smokeless tobacco: Never            Objective:   Vitals  Vitals:    05/02/25 1312 05/02/25 1316   BP: (!) 163/83 (!) 155/79   Pulse: 87    SpO2: (!) 91%    Weight: 123.5 kg (272 lb 4.3 oz)    Height: 5' 8" (1.727 m)           Physical Exam  Vitals and nursing note reviewed.   Constitutional:       Appearance: Normal appearance. She is obese.   Cardiovascular:      Rate and Rhythm: Normal rate and regular rhythm.      Heart sounds: No murmur heard.     No gallop.   Pulmonary:      Effort: Pulmonary effort is normal.      Breath sounds: Normal breath sounds. No wheezing or rales.   Abdominal:      General: Bowel sounds are normal. There is no distension.      Palpations: Abdomen is soft.      Tenderness: There is no abdominal tenderness.   Musculoskeletal:      Right lower leg: No edema.      Left lower leg: No edema.   Skin:     General: Skin is warm and dry.   Neurological:      Mental Status: She is alert and oriented to person, place, and time.           Lab Results    Lab Results   Component Value Date    WBC 8.30 10/18/2022    HGB 15.6 10/18/2022    HCT 43.6 10/18/2022    MCV 86 10/18/2022       Lab Results   Component Value Date     10/18/2022       Lab Results   Component Value Date    K 4.1 02/20/2023    BUN 23 (H) 02/20/2023    CREATININE 0.78 02/20/2023       Lab Results   Component Value Date     (H) 02/20/2023 " "      Lab Results   Component Value Date    AST 26 10/18/2022    ALT 21 10/18/2022    ALBUMIN 4.2 10/18/2022    PROT 7.5 10/18/2022       No results found for: "CHOL", "HDL", "LDLCALC", "TRIG"    No results found for: "CRP", "BNP"      Assessment:     1. Encounter for screening for cardiovascular disorders    2. Chronic fatigue    3. Hyperlipidemia, unspecified hyperlipidemia type    4. Primary hypertension    5. Class 2 severe obesity due to excess calories with serious comorbidity and body mass index (BMI) of 39.0 to 39.9 in adult    6. At risk for sleep apnea        Plan:     Encounter for screening for cardiovascular disease  -given risk factors, significant family history, will rec CT calcium score for risk stratification  -update Echo    2. HTN  -goal BP < 130/80, above goal  -will continue diovan, norvasc as above for  now  -begin home monitoring, log; will F/U close WITH BP LOGS for ongoing management    3. HLD  -continue statin    4. Obesity  -low sodium, heart healthy diet; mediterranean diet education  -mod intensity exercise 30m/day 3-4x/wk  -weight loss    5. Anxiety  -rec referral to psych, patient declines  -F/U with PCP as scheduled    6. At risk for YU  -rec sleep med referral, patient declines      -F/U 2-3 weeks with BP logs       The ASCVD Risk score (Gladys SAMPSON, et al., 2019) failed to calculate for the following reasons:    Cannot find a previous HDL lab    Cannot find a previous total cholesterol lab    Orders Placed This Encounter   Procedures    CT Cardiac Scoring     Standing Status:   Future     Expected Date:   5/2/2025     Expiration Date:   5/2/2026     May the Radiologist modify the order per protocol to meet the clinical needs of the patient?:   Yes    IN OFFICE EKG 12-LEAD (to [place])    Echo     Standing Status:   Future     Expiration Date:   5/2/2026     Release to patient:   Immediate       She expressed verbal understanding and agreed with the plan    Pertinent cardiac images and " EKG reviewed independently.    Continue with current medical plan and lifestyle changes.  Return sooner for concerns or questions. If symptoms persist go to the ED.  I have reviewed all pertinent data including patient's medical history in detail and updated the computerized patient record.     Counseling included discussion regarding imaging findings, diagnosis, possibilities, treatment options, risks and benefits.    Thank you for the opportunity to care for this patient. Will be available for questions if needed.        Signed:  Pratik Rodgers DNP  05/02/2025

## 2025-05-02 ENCOUNTER — OFFICE VISIT (OUTPATIENT)
Dept: CARDIOLOGY | Facility: CLINIC | Age: 75
End: 2025-05-02
Payer: MEDICARE

## 2025-05-02 VITALS
HEART RATE: 87 BPM | SYSTOLIC BLOOD PRESSURE: 155 MMHG | OXYGEN SATURATION: 91 % | WEIGHT: 272.25 LBS | BODY MASS INDEX: 41.26 KG/M2 | DIASTOLIC BLOOD PRESSURE: 79 MMHG | HEIGHT: 68 IN

## 2025-05-02 DIAGNOSIS — Z13.6 ENCOUNTER FOR SCREENING FOR CARDIOVASCULAR DISORDERS: Primary | ICD-10-CM

## 2025-05-02 DIAGNOSIS — R53.82 CHRONIC FATIGUE: Chronic | ICD-10-CM

## 2025-05-02 DIAGNOSIS — I10 PRIMARY HYPERTENSION: ICD-10-CM

## 2025-05-02 DIAGNOSIS — E66.01 CLASS 2 SEVERE OBESITY DUE TO EXCESS CALORIES WITH SERIOUS COMORBIDITY AND BODY MASS INDEX (BMI) OF 39.0 TO 39.9 IN ADULT: ICD-10-CM

## 2025-05-02 DIAGNOSIS — Z91.89 AT RISK FOR SLEEP APNEA: ICD-10-CM

## 2025-05-02 DIAGNOSIS — E66.812 CLASS 2 SEVERE OBESITY DUE TO EXCESS CALORIES WITH SERIOUS COMORBIDITY AND BODY MASS INDEX (BMI) OF 39.0 TO 39.9 IN ADULT: ICD-10-CM

## 2025-05-02 DIAGNOSIS — F41.9 ANXIETY: ICD-10-CM

## 2025-05-02 DIAGNOSIS — E78.5 HYPERLIPIDEMIA, UNSPECIFIED HYPERLIPIDEMIA TYPE: Chronic | ICD-10-CM

## 2025-05-02 PROCEDURE — 99999 PR PBB SHADOW E&M-EST. PATIENT-LVL III: CPT | Mod: PBBFAC,,,

## 2025-05-02 RX ORDER — VALSARTAN AND HYDROCHLOROTHIAZIDE 80; 12.5 MG/1; MG/1
1 TABLET, FILM COATED ORAL EVERY MORNING
COMMUNITY
Start: 2025-03-02

## 2025-05-08 ENCOUNTER — HOSPITAL ENCOUNTER (OUTPATIENT)
Dept: RADIOLOGY | Facility: HOSPITAL | Age: 75
Discharge: HOME | End: 2025-05-08
Payer: MEDICARE

## 2025-05-08 DIAGNOSIS — Z13.6 ENCOUNTER FOR SCREENING FOR CARDIOVASCULAR DISORDERS: ICD-10-CM

## 2025-05-08 PROCEDURE — 75571 CT HRT W/O DYE W/CA TEST: CPT | Mod: TC

## 2025-05-12 ENCOUNTER — HOSPITAL ENCOUNTER (OUTPATIENT)
Dept: CARDIOLOGY | Facility: HOSPITAL | Age: 75
Discharge: HOME | End: 2025-05-12
Payer: MEDICARE

## 2025-05-12 VITALS — BODY MASS INDEX: 39.25 KG/M2 | WEIGHT: 259 LBS | HEIGHT: 68 IN

## 2025-05-12 DIAGNOSIS — I10 PRIMARY HYPERTENSION: ICD-10-CM

## 2025-05-12 PROCEDURE — 93306 TTE W/DOPPLER COMPLETE: CPT | Mod: 26,,, | Performed by: STUDENT IN AN ORGANIZED HEALTH CARE EDUCATION/TRAINING PROGRAM

## 2025-05-12 PROCEDURE — 93306 TTE W/DOPPLER COMPLETE: CPT | Mod: PN

## 2025-05-13 LAB
AORTIC SIZE INDEX: 1.1 CM/M2
APICAL FOUR CHAMBER EJECTION FRACTION: 68 %
APICAL TWO CHAMBER EJECTION FRACTION: 68 %
ASCENDING AORTA: 2.4 CM
AV INDEX (PROSTH): 0.69
AV MEAN GRADIENT: 6 MMHG
AV PEAK GRADIENT: 13 MMHG
AV VALVE AREA BY VELOCITY RATIO: 1.7 CM²
AV VALVE AREA: 1.9 CM²
AV VELOCITY RATIO: 0.61
BSA FOR ECHO PROCEDURE: 2.37 M2
CV ECHO LV RWT: 0.04 CM
DOP CALC AO PEAK VEL: 1.8 M/S
DOP CALC AO VTI: 35.7 CM
DOP CALC LVOT AREA: 2.8 CM2
DOP CALC LVOT DIAMETER: 1.9 CM
DOP CALC LVOT PEAK VEL: 1.1 M/S
DOP CALC LVOT STROKE VOLUME: 69.4 CM3
DOP CALC MV VTI: 38.6 CM
DOP CALCLVOT PEAK VEL VTI: 24.5 CM
E WAVE DECELERATION TIME: 237 MSEC
E/A RATIO: 0.98
E/E' RATIO: 11 M/S
ECHO LV POSTERIOR WALL: 0.1 CM (ref 0.6–1.1)
FRACTIONAL SHORTENING: 33.9 % (ref 28–44)
INTERVENTRICULAR SEPTUM: 0.8 CM (ref 0.6–1.1)
IVC DIAMETER: 1.91 CM
IVRT: 84 MSEC
LEFT ATRIUM AREA SYSTOLIC (APICAL 2 CHAMBER): 19.29 CM2
LEFT ATRIUM AREA SYSTOLIC (APICAL 4 CHAMBER): 18.74 CM2
LEFT ATRIUM VOLUME INDEX MOD: 23 ML/M2
LEFT ATRIUM VOLUME MOD: 53 ML
LEFT INTERNAL DIMENSION IN SYSTOLE: 3.7 CM (ref 2.1–4)
LEFT VENTRICLE DIASTOLIC VOLUME INDEX: 68.42 ML/M2
LEFT VENTRICLE DIASTOLIC VOLUME: 156 ML
LEFT VENTRICLE END DIASTOLIC VOLUME APICAL 2 CHAMBER: 92.35 ML
LEFT VENTRICLE END DIASTOLIC VOLUME APICAL 4 CHAMBER: 114.74 ML
LEFT VENTRICLE END SYSTOLIC VOLUME APICAL 2 CHAMBER: 51.25 ML
LEFT VENTRICLE END SYSTOLIC VOLUME APICAL 4 CHAMBER: 50.44 ML
LEFT VENTRICLE MASS INDEX: 36.4 G/M2
LEFT VENTRICLE SYSTOLIC VOLUME INDEX: 25 ML/M2
LEFT VENTRICLE SYSTOLIC VOLUME: 57 ML
LEFT VENTRICULAR INTERNAL DIMENSION IN DIASTOLE: 5.6 CM (ref 3.5–6)
LEFT VENTRICULAR MASS: 83 G
LV LATERAL E/E' RATIO: 9.8 M/S
LV SEPTAL E/E' RATIO: 13.1 M/S
LVED V (TEICH): 155.53 ML
LVES V (TEICH): 57.32 ML
LVOT MG: 2.58 MMHG
LVOT MV: 0.76 CM/S
MV A" WAVE DURATION": 110.37 MSEC
MV MEAN GRADIENT: 2 MMHG
MV PEAK A VEL: 1.2 M/S
MV PEAK E VEL: 1.18 M/S
MV PEAK GRADIENT: 7 MMHG
MV STENOSIS PRESSURE HALF TIME: 68.78 MS
MV VALVE AREA BY CONTINUITY EQUATION: 1.8 CM2
MV VALVE AREA P 1/2 METHOD: 3.2 CM2
OHS CV RV/LV RATIO: 0.45 CM
OHS LV EJECTION FRACTION SIMPSONS BIPLANE MOD: 68 %
PISA MRMAX VEL: 5.12 M/S
PISA TR MAX VEL: 3 M/S
PULM VEIN S/D RATIO: 1.35
PV PEAK D VEL: 0.52 M/S
PV PEAK GRADIENT: 5 MMHG
PV PEAK S VEL: 0.7 M/S
PV PEAK VELOCITY: 1.07 M/S
RA PRESSURE ESTIMATED: 3 MMHG
RA VOL SYS: 32.97 ML
RIGHT ATRIAL AREA: 13.9 CM2
RIGHT ATRIUM VOLUME AREA LENGTH APICAL 4 CHAMBER: 31.86 ML
RIGHT VENTRICLE DIASTOLIC BASEL DIMENSION: 2.5 CM
RV TB RVSP: 6 MMHG
RV TISSUE DOPPLER FREE WALL SYSTOLIC VELOCITY 1 (APICAL 4 CHAMBER VIEW): 13.02 CM/S
SINUS: 1.97 CM
STJ: 2.1 CM
TDI LATERAL: 0.12 M/S
TDI SEPTAL: 0.09 M/S
TDI: 0.11 M/S
TR MAX PG: 36 MMHG
TRICUSPID ANNULAR PLANE SYSTOLIC EXCURSION: 2.6 CM
TV REST PULMONARY ARTERY PRESSURE: 39 MMHG
Z-SCORE OF LEFT VENTRICULAR DIMENSION IN END DIASTOLE: -4.28
Z-SCORE OF LEFT VENTRICULAR DIMENSION IN END SYSTOLE: -2.68

## 2025-05-13 NOTE — PROGRESS NOTES
Perioperative Cardiovascular Risk Assessment and Management for Noncardiac Procedure/Surgery  - Planned/Proposed Procedure/Surgery: ***  - Risk of Procedure/Surgery: ***  High, Intermediate, Low  - METS: >/=4  - Active Cardiac Conditions: (ADHF, ACS, Severe Valvular Disease, Significant Arrhythmia)   - RCRI Score: ****  - RCRI Risk Factors: Elevated-Risk Surgery, History of Ischemic Heart Disease,History of CHF, History of CVA, Pre-op Treatment with Insulin, Pre-op Creatinine >2, None  - Other CV Risk Factors: (Age, Sex, Family History of Premature CVD, HTN, HLD, DM, DANA, CKD, Stable Angina, Valvular Disease, Stable Arrhythmia, Obesity, Tobacco Use, Physical Inactivity, and Medical Noncompliance)  - The patient is a ***(Low, Moderate, High) risk for a ***(High, Intermediate, Low) risk procedure    Pt has no active cardiac condition (ACS/USA, decompenstated CHF, significant arrhythmias or severe valvular disease). METS >4. Patient has acceptable risk for *** risk non cardiovascular surgery. No further cardiac work up required prior to  undergoing the surgical procedure.  These recommendations follow the 2014 ACC/AHA Guideline on Perioperative Cardiovascular Evaluation and Management of Patients Undergoing Noncardiac Surgery. (JACC Vol. 64 No. 22, Dec 9, 2014, pp y49-p998).

## 2025-05-15 ENCOUNTER — RESULTS FOLLOW-UP (OUTPATIENT)
Dept: CARDIOLOGY | Facility: CLINIC | Age: 75
End: 2025-05-15

## 2025-05-15 ENCOUNTER — TELEPHONE (OUTPATIENT)
Dept: CARDIOLOGY | Facility: CLINIC | Age: 75
End: 2025-05-15
Payer: MEDICARE

## 2025-05-15 NOTE — TELEPHONE ENCOUNTER
----- Message from Pratik Rodgers DNP sent at 5/15/2025  9:10 AM CDT -----  Please contact the patient and let them know that their results were fine and do not require any change in treatment. We will discuss results in detail at scheduled follow-up.  ----- Message -----  From: Chele Fortune MD  Sent: 5/13/2025  12:10 AM CDT  To: Pratik Rodgers DNP

## 2025-05-20 NOTE — PROGRESS NOTES
Cardiology Clinic note    Subjective:   Patient ID:  Josie Iglesias is a 74 y.o. female who presents for follow-up of HTN, HLD    HPI    2025: Routine F/U. Seen in clinic unaccompanied, reports overall doing okay. Ongoing chronic fatigue, HERNANDEZ, insomnia, back pain. Not as active as she'd like to be. CT calcium score 135, reviewed with patient as below. Long conversation about CAD, medical management, secondary prevention. Echo reviewed with patient as below, unremarkable. Patient denies CP, SOB, PND, syncope, palpitations, LE edema. Reports compliance and tolerance with all medications.    2025: Previous patient of Dr. Martinez as below, initial visit for me. 75 yo F with hx of anxiety/depression, HTN, HLD here for routine F/U. Seen in clinic with spouse, reports overall doing okay. Reports over the past few months has had some ongoing issues with anxiety, back pain, generalized fatigue. She is concerned with her significant family history of heart disease. BP high in clinic today, does not monitor at home. Ongoing issues with poor sleep. No new CV s/s, denies CP, SOB/HERNANDEZ, orthopnea, PND, syncope, palpitations, LE edema. Reports compliance and tolerance with all medications.      2023 (Dr. Martinez)   Pt is a 73 yo F w/ PMH of HLD, HTN, Anxiety d/o, and MO w/ BMI of 39.6 who presents for f/u of cp and HTN.  She was last seen 2023 and mediations were adjusted.  Since this time she has been doing ok however notes poor sleep but does not wish to see sleep med.  She states that her BP now is running elevated but does not know the numbers.  She denies cp, sob, edema, orthopnea, PND, presyncope, LOC, palpitations, and claudication.  She notes compliance w/ meds and denies side effects.  She does not exercise and is sedentary.          CV Hx  -----------------------    FH  -grandmother (maternal) CAD,  in 80s from heart dx  -mother - CAD,  in 80s from heart dx  - aunts - cardiovascular  disease      Calcium Score 5/2025  Impression:  Your total calcium score is 135.  The total calcium score of 135 is between the 50 and 75 percentile for females between the ages of 70 and 74.     Echo 5/2025    Left Ventricle: The left ventricle is mildly dilated. Normal wall thickness. There is normal systolic function. Quantitated ejection fraction is 68%. Grade I diastolic dysfunction.    Right Ventricle: The right ventricle is normal in size Systolic function is normal. TAPSE is 2.6 cm.    Mitral Valve: There is mild regurgitation.    Tricuspid Valve: There is mild regurgitation.    Pulmonary Artery: The estimated pulmonary artery systolic pressure is 39 mmHg.    IVC/SVC: Normal venous pressure at 3 mmHg.    Echo 8/2023    Left Ventricle: The left ventricle is normal in size. There is concentric remodeling. Normal wall motion. There is normal systolic function. Ejection fraction by visual approximation is 65%. There is normal diastolic function.    Right Ventricle: Systolic function is normal.    Pulmonary Artery: The estimated pulmonary artery systolic pressure is 28 mmHg.    IVC/SVC: Normal venous pressure at 3 mmHg.    Stress ECG 2022    The patient exercised for 1 minutes 9 seconds on a Fausto protocol, corresponding to a functional capacity of 3 METS, achieving a peak heart rate of 150 bpm, which is 105 % of the age predicted maximum heart rate.    The EKG portion of this study is negative for ischemia.    The patient reported no chest pain during the stress test.    The blood pressure response to stress was normal.    During stress, rare PVCs are noted.    Past Medical History:   Diagnosis Date    Anxiety disorder, unspecified     Depression     High cholesterol     Hypertension           Patient Active Problem List    Diagnosis Date Noted    Coronary artery disease of native artery of native heart with stable angina pectoris 05/23/2025    HERNANDEZ (dyspnea on exertion) 05/23/2025    Chronic fatigue 05/02/2025     Hyperlipidemia 05/02/2025    Class 2 severe obesity due to excess calories with serious comorbidity and body mass index (BMI) of 39.0 to 39.9 in adult 11/14/2022    Primary hypertension 11/14/2022    At risk for sleep apnea 11/14/2022    Anxiety 11/14/2022       Patient's Medications   New Prescriptions    ASPIRIN (ECOTRIN) 81 MG EC TABLET    Take 1 tablet (81 mg total) by mouth once daily.   Previous Medications    AMLODIPINE (NORVASC) 10 MG TABLET    Take 10 mg by mouth once daily.    CLONIDINE (CATAPRES) 0.1 MG TABLET    Take 1 tablet (0.1 mg total) by mouth once daily.    ROSUVASTATIN (CRESTOR) 20 MG TABLET    Take 20 mg by mouth once daily.    SERTRALINE (ZOLOFT) 100 MG TABLET    Take 100 mg by mouth once daily.    VALSARTAN-HYDROCHLOROTHIAZIDE (DIOVAN-HCT) 80-12.5 MG PER TABLET    Take 1 tablet by mouth every morning.   Modified Medications    No medications on file   Discontinued Medications    SPIRONOLACTONE (ALDACTONE) 50 MG TABLET    TAKE 1 TABLET(50 MG) BY MOUTH TWICE DAILY        Review of Systems   Constitutional: Positive for malaise/fatigue. Negative for chills, decreased appetite, diaphoresis, weight gain and weight loss.   Cardiovascular:  Positive for dyspnea on exertion. Negative for chest pain, claudication, irregular heartbeat, leg swelling, near-syncope, orthopnea, palpitations, paroxysmal nocturnal dyspnea and syncope.   Respiratory:  Negative for cough, hemoptysis, shortness of breath and snoring.    Musculoskeletal:  Positive for back pain and joint pain.   Gastrointestinal:  Negative for bloating, abdominal pain, nausea and vomiting.   Neurological:  Negative for light-headedness and weakness.   Psychiatric/Behavioral:  Negative for suicidal ideas. The patient is nervous/anxious.        No family history on file.    Social History     Socioeconomic History    Marital status:    Tobacco Use    Smoking status: Never    Smokeless tobacco: Never            Objective:   Vitals  Vitals:  "   05/23/25 1320 05/23/25 1322   BP: (!) 141/79 130/85   Pulse: 74    SpO2: 96%    Weight: 124.5 kg (274 lb 5.8 oz)    Height: 5' 8" (1.727 m)             Physical Exam  Vitals and nursing note reviewed.   Constitutional:       Appearance: Normal appearance. She is obese.   Cardiovascular:      Rate and Rhythm: Normal rate and regular rhythm.      Heart sounds: No murmur heard.     No gallop.   Pulmonary:      Effort: Pulmonary effort is normal.      Breath sounds: Normal breath sounds. No wheezing or rales.   Abdominal:      General: Bowel sounds are normal. There is no distension.      Palpations: Abdomen is soft.      Tenderness: There is no abdominal tenderness.   Musculoskeletal:      Right lower leg: No edema.      Left lower leg: No edema.   Skin:     General: Skin is warm and dry.   Neurological:      Mental Status: She is alert and oriented to person, place, and time.           Lab Results    Lab Results   Component Value Date    WBC 8.30 10/18/2022    HGB 15.6 10/18/2022    HCT 43.6 10/18/2022    MCV 86 10/18/2022       Lab Results   Component Value Date     10/18/2022       Lab Results   Component Value Date    K 4.1 02/20/2023    BUN 23 (H) 02/20/2023    CREATININE 0.78 02/20/2023       Lab Results   Component Value Date     (H) 02/20/2023       Lab Results   Component Value Date    AST 26 10/18/2022    ALT 21 10/18/2022    ALBUMIN 4.2 10/18/2022    PROT 7.5 10/18/2022       No results found for: "CHOL", "HDL", "LDLCALC", "TRIG"    No results found for: "CRP", "BNP"      Assessment:     1. Coronary artery disease of native artery of native heart with stable angina pectoris    2. Chest pain, unspecified type    3. At risk for sleep apnea    4. HERNANDEZ (dyspnea on exertion)    5. Hyperlipidemia, unspecified hyperlipidemia type    6. Primary hypertension    7. Anxiety          Plan:     CAD  -per CT 5/2025  -ongoin HERNANDEZ, fatigue -  c/f anginal eq; will rec Nuc Stress to r/o ischemic " etiology  -start asa 81  -increase crestor to 20mg daily  -low sodium, heart healthy diet; Mediterranean diet education  -mod intensity exercise 30m/day 3-4x/wk; weight loss    2. HTN  -goal BP < 130/80, at goal  -will continue diovan, norvasc as above    3. HLD  -target LDLc < 70  -escalate statin  -update lipid panel, LFTs ~ 3m    4. Obesity  -low sodium, heart healthy diet; mediterranean diet education  -mod intensity exercise 30m/day 3-4x/wk  -weight loss    5. Anxiety  -rec referral to psych, patient declines  -F/U with PCP as scheduled    6. At risk for YU, insomnia  -rec sleep med referral      -Nuc stress  -sleep med ref  -F/U after Nuc or sooner PRN       The ASCVD Risk score (Gladys SAMPSON, et al., 2019) failed to calculate for the following reasons:    Cannot find a previous HDL lab    Cannot find a previous total cholesterol lab    Orders Placed This Encounter   Procedures    Ambulatory referral/consult to Sleep Disorders     Standing Status:   Future     Expected Date:   5/30/2025     Expiration Date:   6/23/2026     Referral Priority:   Routine     Referral Type:   Consultation     Requested Specialty:   Sleep Medicine     Number of Visits Requested:   1    Nuclear Stress - Cardiology Interpreted     Standing Status:   Future     Expiration Date:   5/23/2026     Which stress agent will be used?:   Pharm     Which medicaton for the stress procedure?:   Regadenoson     Release to patient:   Immediate       She expressed verbal understanding and agreed with the plan    Pertinent cardiac images and EKG reviewed independently.    Continue with current medical plan and lifestyle changes.  Return sooner for concerns or questions. If symptoms persist go to the ED.  I have reviewed all pertinent data including patient's medical history in detail and updated the computerized patient record.     Counseling included discussion regarding imaging findings, diagnosis, possibilities, treatment options, risks and  benefits.    Thank you for the opportunity to care for this patient. Will be available for questions if needed.        Signed:  Pratik Rodgers DNP  05/23/2025

## 2025-05-23 ENCOUNTER — OFFICE VISIT (OUTPATIENT)
Dept: CARDIOLOGY | Facility: CLINIC | Age: 75
End: 2025-05-23
Payer: MEDICARE

## 2025-05-23 VITALS
OXYGEN SATURATION: 96 % | BODY MASS INDEX: 41.59 KG/M2 | SYSTOLIC BLOOD PRESSURE: 130 MMHG | DIASTOLIC BLOOD PRESSURE: 85 MMHG | HEART RATE: 74 BPM | HEIGHT: 68 IN | WEIGHT: 274.38 LBS

## 2025-05-23 DIAGNOSIS — R06.09 DOE (DYSPNEA ON EXERTION): ICD-10-CM

## 2025-05-23 DIAGNOSIS — F41.9 ANXIETY: ICD-10-CM

## 2025-05-23 DIAGNOSIS — R07.9 CHEST PAIN, UNSPECIFIED TYPE: ICD-10-CM

## 2025-05-23 DIAGNOSIS — Z91.89 AT RISK FOR SLEEP APNEA: ICD-10-CM

## 2025-05-23 DIAGNOSIS — I25.118 CORONARY ARTERY DISEASE OF NATIVE ARTERY OF NATIVE HEART WITH STABLE ANGINA PECTORIS: Primary | ICD-10-CM

## 2025-05-23 DIAGNOSIS — E78.5 HYPERLIPIDEMIA, UNSPECIFIED HYPERLIPIDEMIA TYPE: Chronic | ICD-10-CM

## 2025-05-23 DIAGNOSIS — I10 PRIMARY HYPERTENSION: ICD-10-CM

## 2025-05-23 PROCEDURE — 99999 PR PBB SHADOW E&M-EST. PATIENT-LVL IV: CPT | Mod: PBBFAC,,,

## 2025-05-23 RX ORDER — ASPIRIN 81 MG/1
81 TABLET ORAL DAILY
Qty: 90 TABLET | Refills: 3 | Status: SHIPPED | OUTPATIENT
Start: 2025-05-23 | End: 2026-05-23

## 2025-07-08 ENCOUNTER — HOSPITAL ENCOUNTER (OUTPATIENT)
Dept: CARDIOLOGY | Facility: HOSPITAL | Age: 75
Discharge: HOME | End: 2025-07-08
Payer: MEDICARE

## 2025-07-08 ENCOUNTER — HOSPITAL ENCOUNTER (OUTPATIENT)
Dept: RADIOLOGY | Facility: HOSPITAL | Age: 75
Discharge: HOME | End: 2025-07-08
Payer: MEDICARE

## 2025-07-08 DIAGNOSIS — I25.118 CORONARY ARTERY DISEASE OF NATIVE ARTERY OF NATIVE HEART WITH STABLE ANGINA PECTORIS: ICD-10-CM

## 2025-07-08 DIAGNOSIS — R06.09 DOE (DYSPNEA ON EXERTION): ICD-10-CM

## 2025-07-08 LAB
CV STRESS BASE HR: 71 BPM
DIASTOLIC BLOOD PRESSURE: 82 MMHG
NUC REST DIASTOLIC VOLUME INDEX: 105
NUC REST EJECTION FRACTION: 70
NUC REST SYSTOLIC VOLUME INDEX: 31
NUC STRESS DIASTOLIC VOLUME INDEX: 96
NUC STRESS EJECTION FRACTION: 71 %
NUC STRESS SYSTOLIC VOLUME INDEX: 28
OHS CV CPX 85 PERCENT MAX PREDICTED HEART RATE MALE: 125
OHS CV CPX MAX PREDICTED HEART RATE: 147
OHS CV CPX PATIENT IS FEMALE: 1
OHS CV CPX PATIENT IS MALE: 0
OHS CV CPX PEAK DIASTOLIC BLOOD PRESSURE: 82 MMHG
OHS CV CPX PEAK HEAR RATE: 87 BPM
OHS CV CPX PEAK RATE PRESSURE PRODUCT: NORMAL
OHS CV CPX PEAK SYSTOLIC BLOOD PRESSURE: 189 MMHG
OHS CV CPX PERCENT MAX PREDICTED HEART RATE ACHIEVED: 61
OHS CV CPX RATE PRESSURE PRODUCT PRESENTING: NORMAL
OHS CV INITIAL DOSE: 10 MCG/KG/MIN
OHS CV PEAK DOSE: 30 MCG/KG/MIN
SYSTOLIC BLOOD PRESSURE: 189 MMHG

## 2025-07-08 PROCEDURE — 93017 CV STRESS TEST TRACING ONLY: CPT

## 2025-07-08 PROCEDURE — A9502 TC99M TETROFOSMIN: HCPCS

## 2025-07-08 PROCEDURE — 78452 HT MUSCLE IMAGE SPECT MULT: CPT | Mod: 26,,, | Performed by: STUDENT IN AN ORGANIZED HEALTH CARE EDUCATION/TRAINING PROGRAM

## 2025-07-08 PROCEDURE — 78452 HT MUSCLE IMAGE SPECT MULT: CPT

## 2025-07-08 PROCEDURE — 93018 CV STRESS TEST I&R ONLY: CPT | Mod: ,,, | Performed by: STUDENT IN AN ORGANIZED HEALTH CARE EDUCATION/TRAINING PROGRAM

## 2025-07-08 PROCEDURE — 93016 CV STRESS TEST SUPVJ ONLY: CPT | Mod: ,,, | Performed by: STUDENT IN AN ORGANIZED HEALTH CARE EDUCATION/TRAINING PROGRAM

## 2025-07-08 PROCEDURE — 63600175 PHARM REV CODE 636 W HCPCS

## 2025-07-08 RX ORDER — REGADENOSON 0.08 MG/ML
0.4 INJECTION, SOLUTION INTRAVENOUS ONCE
Status: COMPLETED | OUTPATIENT
Start: 2025-07-08 | End: 2025-07-08

## 2025-07-08 RX ADMIN — TETROFOSMIN 30 MILLICURIE: 1.38 INJECTION, POWDER, LYOPHILIZED, FOR SOLUTION INTRAVENOUS at 09:07

## 2025-07-08 RX ADMIN — TETROFOSMIN 10 MILLICURIE: 1.38 INJECTION, POWDER, LYOPHILIZED, FOR SOLUTION INTRAVENOUS at 07:07

## 2025-07-08 RX ADMIN — REGADENOSON 0.4 MG: 0.08 INJECTION, SOLUTION INTRAVENOUS at 09:07

## 2025-07-09 ENCOUNTER — TELEPHONE (OUTPATIENT)
Dept: CARDIOLOGY | Facility: CLINIC | Age: 75
End: 2025-07-09
Payer: MEDICARE

## 2025-07-09 NOTE — TELEPHONE ENCOUNTER
----- Message from Pratik Rodgers DNP sent at 7/9/2025  7:46 AM CDT -----  Please contact the patient and let them know that their results were fine and do not require any change in treatment. Continue with current plan and we will discuss details at follow-up.  ----- Message -----  From: Chele Fortune MD  Sent: 7/8/2025   5:13 PM CDT  To: Pratik Rodgres DNP

## 2025-07-09 NOTE — TELEPHONE ENCOUNTER
----- Message from Pratik Rodgers DNP sent at 7/9/2025  7:46 AM CDT -----  Please contact the patient and let them know that their results were fine and do not require any change in treatment. Continue with current plan and we will discuss details at follow-up.  ----- Message -----  From: Chele Fortune MD  Sent: 7/8/2025   5:13 PM CDT  To: Pratik Rodgers DNP

## 2025-07-21 NOTE — PROGRESS NOTES
Cardiology Clinic note    Subjective:   Patient ID:  Josie Iglesias is a 74 y.o. female who presents for follow-up of HTN, HLD    HPI    7/23/2025: F/U. Seen in clinic unaccompanied, reports overall doing pretty well. Stable fatigue/HERNANDEZ, no new CV s/s. Nuc stress with evidence of old blockage and reviewed with patient as below. Long conversation about CAD, GDMT, lifestyle modification in risk reduction of recurrent dx. Tolerating asa/ statin escalation. Patient denies CP, SOB, orthopnea, PND, syncope, palpitations, LE edema. Reports compliance and tolerance with all medications.    5/23/2025: Routine F/U. Seen in clinic unaccompanied, reports overall doing okay. Ongoing chronic fatigue, HERNANDEZ, insomnia, back pain. Not as active as she'd like to be. CT calcium score 135, reviewed with patient as below. Long conversation about CAD, medical management, secondary prevention. Echo reviewed with patient as below, unremarkable. Patient denies CP, SOB, PND, syncope, palpitations, LE edema. Reports compliance and tolerance with all medications.    5/2/2025: Previous patient of Dr. Martinez as below, initial visit for me. 75 yo F with hx of anxiety/depression, HTN, HLD here for routine F/U. Seen in clinic with spouse, reports overall doing okay. Reports over the past few months has had some ongoing issues with anxiety, back pain, generalized fatigue. She is concerned with her significant family history of heart disease. BP high in clinic today, does not monitor at home. Ongoing issues with poor sleep. No new CV s/s, denies CP, SOB/HERNANDEZ, orthopnea, PND, syncope, palpitations, LE edema. Reports compliance and tolerance with all medications.      8/14/2023 (Dr. Martinez)   Pt is a 73 yo F w/ PMH of HLD, HTN, Anxiety d/o, and MO w/ BMI of 39.6 who presents for f/u of cp and HTN.  She was last seen 2/13/2023 and mediations were adjusted.  Since this time she has been doing ok however notes poor sleep but does not wish to see sleep  med.  She states that her BP now is running elevated but does not know the numbers.  She denies cp, sob, edema, orthopnea, PND, presyncope, LOC, palpitations, and claudication.  She notes compliance w/ meds and denies side effects.  She does not exercise and is sedentary.          CV Hx  -----------------------    FH  -grandmother (maternal) CAD,  in 80s from heart dx  -mother - CAD,  in 80s from heart dx  - aunts - cardiovascular disease    Nuc Stress 2025    Abnormal myocardial perfusion scan.    There is a mild intensity, small sized, fixed perfusion abnormality consistent with scar in the apical wall(s).    There are no other significant perfusion abnormalities.    The gated perfusion images showed an ejection fraction of 70% at rest. The gated perfusion images showed an ejection fraction of 71% post stress.    The ECG portion of the study is negative for ischemia.    The patient reported no chest pain during the stress test.    There were no arrhythmias during stress.    Calcium Score 2025  Impression:  Your total calcium score is 135.  The total calcium score of 135 is between the 50 and 75 percentile for females between the ages of 70 and 74.     Echo 2025    Left Ventricle: The left ventricle is mildly dilated. Normal wall thickness. There is normal systolic function. Quantitated ejection fraction is 68%. Grade I diastolic dysfunction.    Right Ventricle: The right ventricle is normal in size Systolic function is normal. TAPSE is 2.6 cm.    Mitral Valve: There is mild regurgitation.    Tricuspid Valve: There is mild regurgitation.    Pulmonary Artery: The estimated pulmonary artery systolic pressure is 39 mmHg.    IVC/SVC: Normal venous pressure at 3 mmHg.    Echo 2023    Left Ventricle: The left ventricle is normal in size. There is concentric remodeling. Normal wall motion. There is normal systolic function. Ejection fraction by visual approximation is 65%. There is normal diastolic  function.    Right Ventricle: Systolic function is normal.    Pulmonary Artery: The estimated pulmonary artery systolic pressure is 28 mmHg.    IVC/SVC: Normal venous pressure at 3 mmHg.    Stress ECG 2022    The patient exercised for 1 minutes 9 seconds on a Fausto protocol, corresponding to a functional capacity of 3 METS, achieving a peak heart rate of 150 bpm, which is 105 % of the age predicted maximum heart rate.    The EKG portion of this study is negative for ischemia.    The patient reported no chest pain during the stress test.    The blood pressure response to stress was normal.    During stress, rare PVCs are noted.    Past Medical History:   Diagnosis Date    Anxiety disorder, unspecified     Depression     High cholesterol     Hypertension           Patient Active Problem List    Diagnosis Date Noted    Coronary artery disease of native artery of native heart with stable angina pectoris 05/23/2025    HERNANDEZ (dyspnea on exertion) 05/23/2025    Chronic fatigue 05/02/2025    Hyperlipidemia 05/02/2025    Class 2 severe obesity due to excess calories with serious comorbidity and body mass index (BMI) of 39.0 to 39.9 in adult 11/14/2022    Primary hypertension 11/14/2022    At risk for sleep apnea 11/14/2022    Anxiety 11/14/2022       Patient's Medications   New Prescriptions    No medications on file   Previous Medications    AMLODIPINE (NORVASC) 10 MG TABLET    Take 10 mg by mouth once daily.    ASPIRIN (ECOTRIN) 81 MG EC TABLET    Take 1 tablet (81 mg total) by mouth once daily.    CLONIDINE (CATAPRES) 0.1 MG TABLET    Take 1 tablet (0.1 mg total) by mouth once daily.    SERTRALINE (ZOLOFT) 100 MG TABLET    Take 100 mg by mouth once daily.    VALSARTAN-HYDROCHLOROTHIAZIDE (DIOVAN-HCT) 80-12.5 MG PER TABLET    Take 1 tablet by mouth every morning.   Modified Medications    Modified Medication Previous Medication    ROSUVASTATIN (CRESTOR) 20 MG TABLET rosuvastatin (CRESTOR) 20 MG tablet       Take 1 tablet (20  "mg total) by mouth once daily.    Take 20 mg by mouth once daily.   Discontinued Medications    No medications on file        Review of Systems   Constitutional: Positive for malaise/fatigue. Negative for chills, decreased appetite, diaphoresis, weight gain and weight loss.   Cardiovascular:  Positive for dyspnea on exertion. Negative for chest pain, claudication, irregular heartbeat, leg swelling, near-syncope, orthopnea, palpitations, paroxysmal nocturnal dyspnea and syncope.   Respiratory:  Negative for cough, hemoptysis, shortness of breath and snoring.    Musculoskeletal:  Positive for back pain and joint pain.   Gastrointestinal:  Negative for bloating, abdominal pain, nausea and vomiting.   Neurological:  Negative for light-headedness and weakness.   Psychiatric/Behavioral:  Negative for suicidal ideas. The patient is nervous/anxious.        No family history on file.    Social History     Socioeconomic History    Marital status:    Tobacco Use    Smoking status: Never    Smokeless tobacco: Never            Objective:   Vitals  Vitals:    07/23/25 1108 07/23/25 1110   BP: (!) 144/72 137/70   Pulse: 67    SpO2: (!) 93%    Weight: 122.5 kg (270 lb 1 oz)    Height: 5' 8" (1.727 m)               Physical Exam  Vitals and nursing note reviewed.   Constitutional:       Appearance: Normal appearance. She is obese.   Cardiovascular:      Rate and Rhythm: Normal rate and regular rhythm.      Heart sounds: No murmur heard.     No gallop.   Pulmonary:      Effort: Pulmonary effort is normal.      Breath sounds: Normal breath sounds. No wheezing or rales.   Abdominal:      General: Bowel sounds are normal. There is no distension.      Palpations: Abdomen is soft.      Tenderness: There is no abdominal tenderness.   Musculoskeletal:      Right lower leg: No edema.      Left lower leg: No edema.   Skin:     General: Skin is warm and dry.   Neurological:      Mental Status: She is alert and oriented to person, place, " "and time.           Lab Results    Lab Results   Component Value Date    WBC 8.30 10/18/2022    HGB 15.6 10/18/2022    HCT 43.6 10/18/2022    MCV 86 10/18/2022       Lab Results   Component Value Date     10/18/2022       Lab Results   Component Value Date    K 4.1 02/20/2023    BUN 23 (H) 02/20/2023    CREATININE 0.78 02/20/2023       Lab Results   Component Value Date     (H) 02/20/2023       Lab Results   Component Value Date    AST 26 10/18/2022    ALT 21 10/18/2022    ALBUMIN 4.2 10/18/2022    PROT 7.5 10/18/2022       No results found for: "CHOL", "HDL", "LDLCALC", "TRIG"    No results found for: "CRP", "BNP"      Assessment:     1. Coronary artery disease of native artery of native heart with stable angina pectoris    2. Hyperlipidemia, unspecified hyperlipidemia type    3. Primary hypertension    4. HERNANDEZ (dyspnea on exertion)    5. Class 2 severe obesity due to excess calories with serious comorbidity and body mass index (BMI) of 39.0 to 39.9 in adult    6. At risk for sleep apnea            Plan:     CAD  -stable  -on good GDMT, continue asa, statin as above   -low sodium, heart healthy diet; Mediterranean diet education  -mod intensity exercise 30m/day 3-4x/wk; weight loss  -update labs ~ 1m    2. HTN  -goal BP < 130/80, at goal  -will continue diovan, norvasc as above    3. HLD  -target LDLc < 70, statin dose escalation at last visit  -update lipid panel, LFTs ~ 1m    4. Obesity  -low sodium, heart healthy diet; mediterranean diet education  -mod intensity exercise 30m/day 3-4x/wk  -weight loss    5. Anxiety  -rec referral to psych, patient declines  -F/U with PCP as scheduled    6. At risk for YU, insomnia  -rec sleep med referral        -F/U 6m or sooner PRN       The ASCVD Risk score (Gladys SAMPSON, et al., 2019) failed to calculate for the following reasons:    Cannot find a previous HDL lab    Cannot find a previous total cholesterol lab    Orders Placed This Encounter   Procedures    " Comprehensive Metabolic Panel     Standing Status:   Future     Expected Date:   8/23/2025     Expiration Date:   10/21/2026    Lipid Panel     Standing Status:   Future     Expected Date:   8/23/2025     Expiration Date:   10/21/2026       She expressed verbal understanding and agreed with the plan    Pertinent cardiac images and EKG reviewed independently.    Continue with current medical plan and lifestyle changes.  Return sooner for concerns or questions. If symptoms persist go to the ED.  I have reviewed all pertinent data including patient's medical history in detail and updated the computerized patient record.     Counseling included discussion regarding imaging findings, diagnosis, possibilities, treatment options, risks and benefits.    Thank you for the opportunity to care for this patient. Will be available for questions if needed.        Signed:  Pratik Rodgers DNP  07/23/2025

## 2025-07-23 ENCOUNTER — OFFICE VISIT (OUTPATIENT)
Dept: CARDIOLOGY | Facility: CLINIC | Age: 75
End: 2025-07-23
Payer: MEDICARE

## 2025-07-23 VITALS
BODY MASS INDEX: 40.93 KG/M2 | OXYGEN SATURATION: 93 % | DIASTOLIC BLOOD PRESSURE: 70 MMHG | HEART RATE: 67 BPM | WEIGHT: 270.06 LBS | SYSTOLIC BLOOD PRESSURE: 137 MMHG | HEIGHT: 68 IN

## 2025-07-23 DIAGNOSIS — Z91.89 AT RISK FOR SLEEP APNEA: ICD-10-CM

## 2025-07-23 DIAGNOSIS — I10 PRIMARY HYPERTENSION: ICD-10-CM

## 2025-07-23 DIAGNOSIS — R06.09 DOE (DYSPNEA ON EXERTION): ICD-10-CM

## 2025-07-23 DIAGNOSIS — E78.5 HYPERLIPIDEMIA, UNSPECIFIED HYPERLIPIDEMIA TYPE: Chronic | ICD-10-CM

## 2025-07-23 DIAGNOSIS — E66.01 CLASS 2 SEVERE OBESITY DUE TO EXCESS CALORIES WITH SERIOUS COMORBIDITY AND BODY MASS INDEX (BMI) OF 39.0 TO 39.9 IN ADULT: ICD-10-CM

## 2025-07-23 DIAGNOSIS — I25.118 CORONARY ARTERY DISEASE OF NATIVE ARTERY OF NATIVE HEART WITH STABLE ANGINA PECTORIS: Primary | ICD-10-CM

## 2025-07-23 DIAGNOSIS — E66.812 CLASS 2 SEVERE OBESITY DUE TO EXCESS CALORIES WITH SERIOUS COMORBIDITY AND BODY MASS INDEX (BMI) OF 39.0 TO 39.9 IN ADULT: ICD-10-CM

## 2025-07-23 PROCEDURE — 99999 PR PBB SHADOW E&M-EST. PATIENT-LVL III: CPT | Mod: PBBFAC,,,

## 2025-07-23 RX ORDER — ROSUVASTATIN CALCIUM 20 MG/1
20 TABLET, COATED ORAL DAILY
Qty: 90 TABLET | Refills: 3 | Status: SHIPPED | OUTPATIENT
Start: 2025-07-23